# Patient Record
Sex: MALE | Race: OTHER | NOT HISPANIC OR LATINO
[De-identification: names, ages, dates, MRNs, and addresses within clinical notes are randomized per-mention and may not be internally consistent; named-entity substitution may affect disease eponyms.]

---

## 2020-11-13 ENCOUNTER — APPOINTMENT (OUTPATIENT)
Dept: RADIOLOGY | Facility: CLINIC | Age: 60
End: 2020-11-13
Payer: COMMERCIAL

## 2020-11-13 ENCOUNTER — OUTPATIENT (OUTPATIENT)
Dept: OUTPATIENT SERVICES | Facility: HOSPITAL | Age: 60
LOS: 1 days | End: 2020-11-13
Payer: COMMERCIAL

## 2020-11-13 DIAGNOSIS — Z00.8 ENCOUNTER FOR OTHER GENERAL EXAMINATION: ICD-10-CM

## 2020-11-13 PROCEDURE — 73562 X-RAY EXAM OF KNEE 3: CPT

## 2020-11-13 PROCEDURE — 73562 X-RAY EXAM OF KNEE 3: CPT | Mod: 26,LT

## 2021-04-02 ENCOUNTER — TRANSCRIPTION ENCOUNTER (OUTPATIENT)
Age: 61
End: 2021-04-02

## 2021-04-02 PROBLEM — Z00.00 ENCOUNTER FOR PREVENTIVE HEALTH EXAMINATION: Status: ACTIVE | Noted: 2021-04-02

## 2022-02-05 ENCOUNTER — EMERGENCY (EMERGENCY)
Facility: HOSPITAL | Age: 62
LOS: 1 days | Discharge: HOME | End: 2022-02-05
Attending: EMERGENCY MEDICINE | Admitting: EMERGENCY MEDICINE
Payer: COMMERCIAL

## 2022-02-05 VITALS
SYSTOLIC BLOOD PRESSURE: 128 MMHG | TEMPERATURE: 98 F | HEART RATE: 84 BPM | DIASTOLIC BLOOD PRESSURE: 91 MMHG | RESPIRATION RATE: 17 BRPM | OXYGEN SATURATION: 100 %

## 2022-02-05 PROCEDURE — 99283 EMERGENCY DEPT VISIT LOW MDM: CPT | Mod: 25

## 2022-02-05 PROCEDURE — 12011 RPR F/E/E/N/L/M 2.5 CM/<: CPT

## 2022-02-05 RX ORDER — TETANUS TOXOID, REDUCED DIPHTHERIA TOXOID AND ACELLULAR PERTUSSIS VACCINE, ADSORBED 5; 2.5; 8; 8; 2.5 [IU]/.5ML; [IU]/.5ML; UG/.5ML; UG/.5ML; UG/.5ML
0.5 SUSPENSION INTRAMUSCULAR ONCE
Refills: 0 | Status: COMPLETED | OUTPATIENT
Start: 2022-02-05 | End: 2022-02-05

## 2022-02-05 RX ADMIN — TETANUS TOXOID, REDUCED DIPHTHERIA TOXOID AND ACELLULAR PERTUSSIS VACCINE, ADSORBED 0.5 MILLILITER(S): 5; 2.5; 8; 8; 2.5 SUSPENSION INTRAMUSCULAR at 16:20

## 2022-02-05 NOTE — ED ADULT TRIAGE NOTE - CHIEF COMPLAINT QUOTE
pt states he slipped on ice yesterday and sustained a lac on his L eyelid. bleeding controlled in triage. gauze applied

## 2022-02-05 NOTE — ED PROVIDER NOTE - NSFOLLOWUPINSTRUCTIONS_ED_ALL_ED_FT
-You will be contacted by a hospital representative to help set up an appointment with a plastic surgeon during business hours. You may also use the patient access number above to set up your own appointment.    -Your stitches will need to be removed within 3-5 days.    -Wash your face gently with soap and water daily. Do not scrub as it may disrupt your stitches.    -If you notice redness, puss, or pain of your eyelid, please seek medical attention. -You will be contacted by a hospital representative to help set up an appointment with a plastic surgeon during business hours. You may also use the patient access number above to set up your own appointment.    -Your stitches will need to be removed within 3-5 days. You may return to the ER or go to the Plastic surgeon.    -Wash your face gently with soap and water daily. Do not scrub as it may disrupt your stitches.    -If you notice redness, puss, or pain of your eyelid, please seek medical attention.

## 2022-02-05 NOTE — ED PROVIDER NOTE - PATIENT PORTAL LINK FT
You can access the FollowMyHealth Patient Portal offered by [facility] by registering at the following website: [email or web address]. By joining [facility]’s FollowMyHealth portal, you will also be able to view your health information using other applications (apps) compatible with our system.

## 2022-12-22 PROBLEM — E11.9 TYPE 2 DIABETES MELLITUS WITHOUT COMPLICATIONS: Chronic | Status: ACTIVE | Noted: 2022-02-05

## 2022-12-27 ENCOUNTER — APPOINTMENT (OUTPATIENT)
Dept: PODIATRY | Facility: CLINIC | Age: 62
End: 2022-12-27
Payer: COMMERCIAL

## 2022-12-27 DIAGNOSIS — Z78.9 OTHER SPECIFIED HEALTH STATUS: ICD-10-CM

## 2022-12-27 PROCEDURE — 99203 OFFICE O/P NEW LOW 30 MIN: CPT

## 2022-12-27 PROCEDURE — 73620 X-RAY EXAM OF FOOT: CPT | Mod: LT

## 2022-12-27 NOTE — REASON FOR VISIT
[Initial Visit] : an initial visit for [Foot Pain] : foot pain [Plantar Fasciitis] : plantar fasciitis

## 2022-12-28 NOTE — ASSESSMENT
[Verbal] : verbal [Patient] : patient [Good - alert, interested, motivated] : Good - alert, interested, motivated [Pressure relief] : pressure relief [Off-loading] : off-loading [Other: ____] : [unfilled] [FreeTextEntry1] : Impression: More than likely the patient has a Plantar fasciitis, left (M72.2) with an inferior calcaneal spur (M77.3) and this may be the source of his irritation.  \par At this time, the patient does not have a significant amount of pain and he was advised on stretching, oral anti-inflammatories as necessary and appropriate soft-soled shoe gear.  \par Did not feel any further intervention was warranted at this time.  Any questions or problems, patient is to contact the office. \par

## 2022-12-28 NOTE — HISTORY OF PRESENT ILLNESS
[Sneakers] : vicky [FreeTextEntry1] : Patient presents today with a history of heel pain, left that is most painful first thing in the morning.  He stretches and then walks and the pain becomes minimal.  He was concerned.  He wears cushioned socks.  This has been present since June and started when he was gardening.

## 2022-12-28 NOTE — CONSULT LETTER
[Dear  ___] : Dear  [unfilled], [Consult Letter:] : I had the pleasure of evaluating your patient, [unfilled]. [Please see my note below.] : Please see my note below. [Consult Closing:] : Thank you very much for allowing me to participate in the care of this patient.  If you have any questions, please do not hesitate to contact me. [Sincerely,] : Sincerely, [FreeTextEntry2] : Gopi Whitehead MD\par [address] [facility]\par [address]\[place] [address] [FreeTextEntry3] : Charles Lombardi, DPM

## 2022-12-28 NOTE — PROCEDURE
[FreeTextEntry1] : X-rays were taken to rule out a fracture or stress fracture.\par (medial, lateral - right foot) There is an 2 inferior and calcaneal spurs; one is posterior and one is anterior.

## 2022-12-28 NOTE — PHYSICAL EXAM
[No Joint Swelling] : no joint swelling [Skin Color & Pigmentation] : normal skin color and pigmentation [Skin Turgor] : normal skin turgor [] : no rash [Skin Lesions] : no skin lesions [General Appearance - Alert] : alert [General Appearance - Well-Appearing] : healthy appearing [2+] : left foot dorsalis pedis 2+ [Sensation] : the sensory exam was normal to light touch and pinprick [No Focal Deficits] : no focal deficits [Deep Tendon Reflexes (DTR)] : deep tendon reflexes were 2+ and symmetric [Motor Exam] : the motor exam was normal [Oriented To Time, Place, And Person] : oriented to person, place, and time [Delayed in the Right Toes] : capillary refills normal in right toes [Delayed in the Left Toes] : capillary refills normal in the left toes [FreeTextEntry3] : Temperature gradient: warm to cool. [de-identified] : Mild pain on palpation, deeply and centrally within the plantar aspect of the heel.  Non-painful, non-crepitant.  No pain on medial and lateral compression.  \par  [Foot Ulcer] : no foot ulcer [Skin Induration] : no skin induration

## 2023-08-27 ENCOUNTER — EMERGENCY (EMERGENCY)
Facility: HOSPITAL | Age: 63
LOS: 1 days | Discharge: HOME | End: 2023-08-27
Attending: EMERGENCY MEDICINE | Admitting: EMERGENCY MEDICINE
Payer: COMMERCIAL

## 2023-08-27 ENCOUNTER — EMERGENCY (EMERGENCY)
Facility: HOSPITAL | Age: 63
LOS: 1 days | Discharge: HOME | End: 2023-08-27
Attending: STUDENT IN AN ORGANIZED HEALTH CARE EDUCATION/TRAINING PROGRAM | Admitting: EMERGENCY MEDICINE
Payer: COMMERCIAL

## 2023-08-27 VITALS
TEMPERATURE: 98 F | SYSTOLIC BLOOD PRESSURE: 116 MMHG | OXYGEN SATURATION: 100 % | RESPIRATION RATE: 17 BRPM | HEART RATE: 81 BPM | DIASTOLIC BLOOD PRESSURE: 87 MMHG

## 2023-08-27 VITALS — SYSTOLIC BLOOD PRESSURE: 126 MMHG | DIASTOLIC BLOOD PRESSURE: 81 MMHG | OXYGEN SATURATION: 100 % | HEART RATE: 70 BPM

## 2023-08-27 VITALS
DIASTOLIC BLOOD PRESSURE: 80 MMHG | HEART RATE: 93 BPM | SYSTOLIC BLOOD PRESSURE: 165 MMHG | TEMPERATURE: 98 F | RESPIRATION RATE: 15 BRPM | OXYGEN SATURATION: 100 %

## 2023-08-27 LAB
ALBUMIN SERPL ELPH-MCNC: 4.6 G/DL — SIGNIFICANT CHANGE UP (ref 3.3–5)
ALP SERPL-CCNC: 69 U/L — SIGNIFICANT CHANGE UP (ref 40–120)
ALT FLD-CCNC: 42 U/L — HIGH (ref 4–41)
ANION GAP SERPL CALC-SCNC: 14 MMOL/L — SIGNIFICANT CHANGE UP (ref 7–14)
APPEARANCE UR: CLEAR — SIGNIFICANT CHANGE UP
AST SERPL-CCNC: 32 U/L — SIGNIFICANT CHANGE UP (ref 4–40)
BACTERIA # UR AUTO: NEGATIVE /HPF — SIGNIFICANT CHANGE UP
BASOPHILS # BLD AUTO: 0.05 K/UL — SIGNIFICANT CHANGE UP (ref 0–0.2)
BASOPHILS NFR BLD AUTO: 0.4 % — SIGNIFICANT CHANGE UP (ref 0–2)
BILIRUB SERPL-MCNC: 0.9 MG/DL — SIGNIFICANT CHANGE UP (ref 0.2–1.2)
BILIRUB UR-MCNC: NEGATIVE — SIGNIFICANT CHANGE UP
BLD GP AB SCN SERPL QL: NEGATIVE — SIGNIFICANT CHANGE UP
BUN SERPL-MCNC: 15 MG/DL — SIGNIFICANT CHANGE UP (ref 7–23)
CALCIUM SERPL-MCNC: 9.6 MG/DL — SIGNIFICANT CHANGE UP (ref 8.4–10.5)
CAST: 0 /LPF — SIGNIFICANT CHANGE UP (ref 0–4)
CHLORIDE SERPL-SCNC: 100 MMOL/L — SIGNIFICANT CHANGE UP (ref 98–107)
CO2 SERPL-SCNC: 24 MMOL/L — SIGNIFICANT CHANGE UP (ref 22–31)
COLOR SPEC: YELLOW — SIGNIFICANT CHANGE UP
CREAT SERPL-MCNC: 0.82 MG/DL — SIGNIFICANT CHANGE UP (ref 0.5–1.3)
DIFF PNL FLD: NEGATIVE — SIGNIFICANT CHANGE UP
EGFR: 99 ML/MIN/1.73M2 — SIGNIFICANT CHANGE UP
EOSINOPHIL # BLD AUTO: 0.06 K/UL — SIGNIFICANT CHANGE UP (ref 0–0.5)
EOSINOPHIL NFR BLD AUTO: 0.5 % — SIGNIFICANT CHANGE UP (ref 0–6)
GLUCOSE SERPL-MCNC: 173 MG/DL — HIGH (ref 70–99)
GLUCOSE UR QL: NEGATIVE MG/DL — SIGNIFICANT CHANGE UP
HCT VFR BLD CALC: 44.5 % — SIGNIFICANT CHANGE UP (ref 39–50)
HGB BLD-MCNC: 14.5 G/DL — SIGNIFICANT CHANGE UP (ref 13–17)
IANC: 9.48 K/UL — HIGH (ref 1.8–7.4)
IMM GRANULOCYTES NFR BLD AUTO: 1 % — HIGH (ref 0–0.9)
KETONES UR-MCNC: NEGATIVE MG/DL — SIGNIFICANT CHANGE UP
LEUKOCYTE ESTERASE UR-ACNC: NEGATIVE — SIGNIFICANT CHANGE UP
LYMPHOCYTES # BLD AUTO: 17.2 % — SIGNIFICANT CHANGE UP (ref 13–44)
LYMPHOCYTES # BLD AUTO: 2.25 K/UL — SIGNIFICANT CHANGE UP (ref 1–3.3)
MCHC RBC-ENTMCNC: 26.1 PG — LOW (ref 27–34)
MCHC RBC-ENTMCNC: 32.6 GM/DL — SIGNIFICANT CHANGE UP (ref 32–36)
MCV RBC AUTO: 80.2 FL — SIGNIFICANT CHANGE UP (ref 80–100)
MONOCYTES # BLD AUTO: 1.09 K/UL — HIGH (ref 0–0.9)
MONOCYTES NFR BLD AUTO: 8.3 % — SIGNIFICANT CHANGE UP (ref 2–14)
NEUTROPHILS # BLD AUTO: 9.48 K/UL — HIGH (ref 1.8–7.4)
NEUTROPHILS NFR BLD AUTO: 72.6 % — SIGNIFICANT CHANGE UP (ref 43–77)
NITRITE UR-MCNC: NEGATIVE — SIGNIFICANT CHANGE UP
NRBC # BLD: 0 /100 WBCS — SIGNIFICANT CHANGE UP (ref 0–0)
NRBC # FLD: 0 K/UL — SIGNIFICANT CHANGE UP (ref 0–0)
PH UR: 6.5 — SIGNIFICANT CHANGE UP (ref 5–8)
PLATELET # BLD AUTO: 284 K/UL — SIGNIFICANT CHANGE UP (ref 150–400)
POTASSIUM SERPL-MCNC: 4.5 MMOL/L — SIGNIFICANT CHANGE UP (ref 3.5–5.3)
POTASSIUM SERPL-SCNC: 4.5 MMOL/L — SIGNIFICANT CHANGE UP (ref 3.5–5.3)
PROT SERPL-MCNC: 8.1 G/DL — SIGNIFICANT CHANGE UP (ref 6–8.3)
PROT UR-MCNC: NEGATIVE MG/DL — SIGNIFICANT CHANGE UP
RBC # BLD: 5.55 M/UL — SIGNIFICANT CHANGE UP (ref 4.2–5.8)
RBC # FLD: 13.8 % — SIGNIFICANT CHANGE UP (ref 10.3–14.5)
RBC CASTS # UR COMP ASSIST: 0 /HPF — SIGNIFICANT CHANGE UP (ref 0–4)
RH IG SCN BLD-IMP: POSITIVE — SIGNIFICANT CHANGE UP
SODIUM SERPL-SCNC: 138 MMOL/L — SIGNIFICANT CHANGE UP (ref 135–145)
SP GR SPEC: 1.01 — SIGNIFICANT CHANGE UP (ref 1–1.03)
SQUAMOUS # UR AUTO: 0 /HPF — SIGNIFICANT CHANGE UP (ref 0–5)
UROBILINOGEN FLD QL: 0.2 MG/DL — SIGNIFICANT CHANGE UP (ref 0.2–1)
WBC # BLD: 13.06 K/UL — HIGH (ref 3.8–10.5)
WBC # FLD AUTO: 13.06 K/UL — HIGH (ref 3.8–10.5)
WBC UR QL: 0 /HPF — SIGNIFICANT CHANGE UP (ref 0–5)

## 2023-08-27 PROCEDURE — 99285 EMERGENCY DEPT VISIT HI MDM: CPT

## 2023-08-27 RX ORDER — CIPROFLOXACIN LACTATE 400MG/40ML
250 VIAL (ML) INTRAVENOUS ONCE
Refills: 0 | Status: DISCONTINUED | OUTPATIENT
Start: 2023-08-27 | End: 2023-08-27

## 2023-08-27 RX ORDER — CIPROFLOXACIN LACTATE 400MG/40ML
1 VIAL (ML) INTRAVENOUS
Qty: 6 | Refills: 0
Start: 2023-08-27 | End: 2023-08-29

## 2023-08-27 RX ORDER — CIPROFLOXACIN LACTATE 400MG/40ML
500 VIAL (ML) INTRAVENOUS ONCE
Refills: 0 | Status: COMPLETED | OUTPATIENT
Start: 2023-08-27 | End: 2023-08-27

## 2023-08-27 RX ORDER — TAMSULOSIN HYDROCHLORIDE 0.4 MG/1
1 CAPSULE ORAL
Qty: 7 | Refills: 0
Start: 2023-08-27 | End: 2023-09-02

## 2023-08-27 RX ADMIN — Medication 500 MILLIGRAM(S): at 16:38

## 2023-08-27 NOTE — ED PROVIDER NOTE - PROGRESS NOTE DETAILS
Reyes output 1L, patient resting comfortably Reyes output 1L, patient resting comfortably. Given return precautions and follow-up instructions with teach back.  No blood work indicated at this time. Jimmy Lynn, ED Attending

## 2023-08-27 NOTE — ED PROVIDER NOTE - PHYSICAL EXAMINATION
General- appears uncomfortable, unable to lay still  HEENT- NCAT, EOMI  Cardio- tachycardic to 110s  Resp- lungs CTA B/L  Abd- soft, moderate distension, diffuse tenderness   Neuro- exam limited 2/2 intoxication and pain

## 2023-08-27 NOTE — ED PROVIDER NOTE - ATTENDING CONTRIBUTION TO CARE
I have personally performed a face to face medical and diagnostic evaluation of the patient. I have discussed with and reviewed the Resident's note and agree with the History, ROS, Physical Exam and MDM unless otherwise indicated. A brief summary of my personal evaluation and impression can be found below.    63-year-old male with no pertinent past medical history presenting with chief complaint of acute urinary retention.  States that he has not been able to pee since dinner tonight.  History of similar event once.  Not currently on any medications for BPH.  On exam, vital signs stable, mild tachycardia, suprapubic tenderness to palpation.  POCUS showing approximately 600 mL of urine in the bladder.  Plan for Reyes placement, UA UC.  If no actionable findings on urine, will DC with leg bag and urology follow-up.  We will monitor in the ER for auto diuresis.  No labs indicated at this time.  Reassess to dispo. Jimmy Lynn, ED Attending

## 2023-08-27 NOTE — ED ADULT NURSE NOTE - CHIEF COMPLAINT QUOTE
Pt arrives after being discharged over night for urinary retention. Pt states he had a lynch and he is now experiencing burning pain and bloody urination. Noted to have mariana red blood in drainage bag at this time. Denies use of blood thinners. Hx DM Finger stick 190

## 2023-08-27 NOTE — ED PROVIDER NOTE - OBJECTIVE STATEMENT
63 y/oM h/o DM (treated with "herbal substances") p/w urinary retention since 7pm. Endorsing urinary urgency however unable to urinate, with worsening pain and abdominal distention since 11pm. Admits to having several vodka crans but does not usually drink alcohol. Had a similar episode of retention ~5 years ago, but was able to urinate after drinking coffee. Denies fevers, HA, CP, SOB, N/V/D/C.

## 2023-08-27 NOTE — ED PROVIDER NOTE - PATIENT PORTAL LINK FT
You can access the FollowMyHealth Patient Portal offered by [facility] by registering at the following website: [email or web address]. By joining [facility]’s FollowMyHealth portal, you will also be able to view your health information using other applications (apps) compatible with our system.

## 2023-08-27 NOTE — ED PROVIDER NOTE - CLINICAL SUMMARY MEDICAL DECISION MAKING FREE TEXT BOX
Jailene Salinas DO PGY-3  63 year old male with PMH BPH, DM presents to the ED with penile pain after catching lynch on pants while getting ready to shower. POCUS demonstrated lynch out of place. Blood likely due to trauma after movement of lynch. Patient prefers trial of void prior to reinserting lynch. Will TOV and re-assess.

## 2023-08-27 NOTE — ED PROVIDER NOTE - CARE PLAN
Assessment and plan of treatment:	63 y/oM h/o DM (treated with "herbal substances") p/w urinary retention since 7pm    POCUS bladder with volume 640mL, will place lynch and obtain UA, UCx. Consider alcohol induced retention vs neurogenic vs obstructive BPH.   1 Principal Discharge DX:	Urinary retention  Assessment and plan of treatment:	63 y/oM h/o DM (treated with "herbal substances") p/w urinary retention since 7pm    POCUS bladder with volume 640mL, will place lynch and obtain UA, UCx. Consider alcohol induced retention vs neurogenic vs obstructive BPH.

## 2023-08-27 NOTE — ED PROVIDER NOTE - OBJECTIVE STATEMENT
63-year-old male with medical history of BPH presents to the ED with hematuria.  Patient reports that he had a Lynch catheter placed at 3 AM because he was in urinary retention, today was getting ready to shower when the catheter pulled on his pants. Since then the lynch has not been draining urine and has had bright red blood. Denies fever, chills, chest pain, shortness of breath, nausea, vomiting, diarrhea. Lynch had previously been draining normally with clear urine prior to th eevent.

## 2023-08-27 NOTE — ED ADULT NURSE NOTE - NSFALLUNIVINTERV_ED_ALL_ED
Bed/Stretcher in lowest position, wheels locked, appropriate side rails in place/Call bell, personal items and telephone in reach/Instruct patient to call for assistance before getting out of bed/chair/stretcher/Non-slip footwear applied when patient is off stretcher/[place] to call system/Physically safe environment - no spills, clutter or unnecessary equipment/Purposeful proactive rounding/Room/bathroom lighting operational, light cord in reach

## 2023-08-27 NOTE — ED PROVIDER NOTE - ATTENDING CONTRIBUTION TO CARE
This is a 63-year-old male with a past medical history of BPH presenting with hematuria.  Patient was seen in this emergency department earlier this morning and had a Reyes catheter placed because he had retention.  States he was getting ready to shower when the catheter pulled on his pants and since then the Reyes has not been draining and he has had red blood coming from his penis.  He does not feel dizziness he is not lightheaded he does not have any chest pain or shortness of breath.  He has slight pain at the insertion site of the Reyes catheter.  Ultrasound performed revealing Reyes catheter balloon not in the bladder therefore balloon was deflated and catheter was removed.  Meatus appears intact.  Patient wanted to have a trial of void which we offered him.  However upon voiding bright red blood coming from penis.  Low suspicion for urethral injury however with the bright red blood we did consult urology.  Urology has seen patient Reyes has been placed and they advised 5 days of Cipro with uro follow-up.  This was relayed to the patient.  Patient was advised that if he begins to feel pain in the tendons to immediately call urology as this is a side effect of ciprofloxacin.  He was told not to combine ciprofloxacin with steroids.  He will also be given Flomax he was told to take Flomax at night and that it can cause some lightheadedness.  Patient was given strict return precautions.  Patient was given Reyes catheter education.  Patient has no concerns at this time and will be discharged home.

## 2023-08-27 NOTE — ED ADULT NURSE NOTE - OBJECTIVE STATEMENT
Patient received in ED room 24. A&Ox4 and ambulatory. C/o urinary retention associated with lower abdominal pain, radiating to lower back. As per pt, drank 8 cranberry vodkas starting at 1900 last night. Pt appears sober, able to appropriately answer assessment questions. Pt states has urge to urinate, however unable to. Abdominal distention noted, pt states abdomen is more distended than baseline. Appears uncomfortable in stretcher. Speaking in full and complete sentences. HOB elevated. Last urine output was around 1800 yesterday as per pt. Offers no other complaints, denies CP, SOB, nausea, vomiting, headache, lightheadedness, dizziness, fever or chills. CIWA performed and charted in flowsheet. Family at bedside. Bed in lowest position, call bell in hand, fall precautions in effect, appropriate side rails up, wheels locked, safety maintained. Awaiting MD orders.

## 2023-08-27 NOTE — ED ADULT NURSE REASSESSMENT NOTE - NSFALLHARMRISKINTERV_ED_ALL_ED

## 2023-08-27 NOTE — ED PROCEDURE NOTE - ATTENDING CONTRIBUTION TO CARE
I have personally discussed the indications, risks and benefits of the above procedure with the resident. I was present for key portions of the procedure and assisted when required. Jimmy Lynn, ED Attending

## 2023-08-27 NOTE — ED ADULT NURSE REASSESSMENT NOTE - NS ED NURSE REASSESS COMMENT FT1
Urinary drainage bag changed to leg bag as per MD Sanchez verbal orders. Pt verbalizes improvement in pain, denies any pain. Denies CP, abdominal pain, SOB, nausea, vomiting, headache, lightheadedness, dizziness, fever or chills. Well-appearing. Appears comfortably sitting up in stretcher HOB elevated. Speaking in full and complete sentences. No acute distress noted. family at bedside. Safety maintained. Awaiting dispo as per MD order.
Indwelling Reyes catheter placed as ordered. Size 14. Tolerated well. 1200mL light yellow urine drained in drainage bag. Pt verbalizes improvement in pain level, currently reports 6/10 pain level. Appears comfortable laying in stretcher. Family back at bedside. NSR on bedside cardiac monitor. Respirations even and unlabored. No acute distress noted. Speaking in full and complete sentences. Bed in lowest position, call bell in hand, wheels locked, safety maintained. Awaiting further orders.

## 2023-08-27 NOTE — ED PROVIDER NOTE - NSPTACCESSSVCSAPPTDETAILS_ED_ALL_ED_FT
Patient needs immediate follow-up with a urologist for a Reyes catheter that was placed in the emergency department for urinary retention.

## 2023-08-27 NOTE — ED PROVIDER NOTE - PATIENT PORTAL LINK FT
You can access the FollowMyHealth Patient Portal offered by [facility] by registering at the following website: [email or web address]. By joining [employer]’s FollowMyHealth portal, you will also be able to view your health information using other applications (apps) compatible with our system.

## 2023-08-27 NOTE — PROCEDURE NOTE - ADDITIONAL PROCEDURE DETAILS
Initially placed 20Fr 6-eye catheter which drained initially about 20cc of red urine with small clots followed by about 700cc of clear yellow urine. Then irrigated the bladder and removed minimal clot until the urine was clear. 18Fr lynch catheter placed with 10cc in balloon and urine was pink tinged

## 2023-08-27 NOTE — ED PROVIDER NOTE - CLINICAL SUMMARY MEDICAL DECISION MAKING FREE TEXT BOX
63-year-old male here for acute urinary retention.  Point-of-care ultrasound demonstrates significant retention with greater than 600 mL.  We will place a Reyes catheter, likely discharge with urology follow-up as long as there are no concerning findings on urinalysis.

## 2023-08-27 NOTE — ED PROVIDER NOTE - PROGRESS NOTE DETAILS
Jailene Salinas, DO PGY-3  Patient with large amount of bright red blood continuously draining from penis after trial of void. Will obtain labs and consult urology. Jailene Salinas, DO PGY-3  Uro consulted, will see patient in the ER. Jailene Salinas DO PGY-3  Urology has cleared patient for discharge home, recommend ciprofloxacin and flomax. Will see patient in their office on Friday. Discussed antibiotics with patient and told to discuss with urology for any signs/symptoms of tendon ruptures.     Discussed the plan for discharge home with the patient. Advised return precautions for any alarming or worsening symptoms, or any other concerns. Recommended that the patient call their primary care doctor today, inform them of their visit to the ER, and to obtain repeat evaluation from their PCP in the next 1-3 days. Patient expresses understanding and agrees with the plan for follow up. At the time of discharge the patient remained stable, in no acute distress.

## 2023-08-27 NOTE — ED PROVIDER NOTE - NSFOLLOWUPINSTRUCTIONS_ED_ALL_ED_FT
[facility]  [address] [place] [id number]  [phone] You were seen in the ER today for lynch catheter problems.    FOLLOW UP WITH THE UROLOGIST ON FRIDAY. CALL THE OFFICE TO CONFIRM AN APPOINTMENT.  [facility]  [address] [place] [address] [id number] [phone]    Take the antibiotics as prescribed: ciprofloxacin 1 tablet twice a day for the next 3 days.  Take tamsulosin as prescribed: 1 tablet at bedtime.     Please follow up with your primary care doctor within 1 - 3 days. Call and let them know you were seen in the ER today.   Bring the results of your blood work and imaging with you to your appointment, if applicable.    Return to the ER for any worsening symptoms or concerns, including chest pain, shortness of breath, lightheadedness, weakness, or any other concerns.

## 2023-08-27 NOTE — ED PROVIDER NOTE - NSFOLLOWUPINSTRUCTIONS_ED_ALL_ED_FT
You were seen in the emergency department for urinary retention. We have evaluated you and determined that you do not require further hospital interventions.    During your stay you had the following relevant results: Urine test that do not show evidence of infection    Please follow up with a urologist to discuss the results of your stay in our department.    If you start to experience worsening symptoms such as severe abdominal pain, nausea or vomiting, fevers or chills, please return to the emergency department for further evaluation.

## 2023-08-27 NOTE — ED ADULT NURSE NOTE - OBJECTIVE STATEMENT
pt had lynch placed during the night for Urinary retention c/o that he has noticed blood in the drainage bag, attending performed bladder scan and lynch balloon was not in the bladder and lynch removed by attending.

## 2023-08-27 NOTE — PROCEDURE NOTE - NSPROCDETAILS_GEN_ALL_CORE
sterile technique, indwelling urinary device inserted/sterile technique, non-indwelling urinary device inserted

## 2023-08-27 NOTE — ED PROVIDER NOTE - PHYSICAL EXAMINATION
PHYSICAL EXAM:  CONSTITUTIONAL: Well appearing, awake, alert, oriented to person, place, time/situation and in no apparent distress.  HEAD: Atraumatic  EYES: Clear bilaterally, pupils equal, round and reactive to light.  ENMT: Airway patent, Nasal mucosa clear. Mouth with normal mucosa. Uvula is midline.   CARDIAC: Normal rate, regular rhythm. +S1/S2. No murmurs, rubs or gallops.  RESPIRATORY: Breathing unlabored. Breath sounds clear and equal bilaterally.  ABDOMEN:  Soft, nontender, nondistended. No rebound tenderness or guarding.  GENITOURINARY: Normal external male genitalia with catheter in urethra, red blood in lynch bag.   NEUROLOGICAL: Alert and oriented, no focal deficits, no motor or sensory deficits.  MSK: No clubbing, cyanosis, or edema. Full range of motion of all extremities.   SKIN: Skin warm and dry. No evidence of rashes or lesions.

## 2023-08-27 NOTE — ED PROVIDER NOTE - PLAN OF CARE
63 y/oM h/o DM (treated with "herbal substances") p/w urinary retention since 7pm    POCUS bladder with volume 640mL, will place lynch and obtain UA, UCx. Consider alcohol induced retention vs neurogenic vs obstructive BPH.

## 2023-08-27 NOTE — CONSULT NOTE ADULT - SUBJECTIVE AND OBJECTIVE BOX
HPI   63-year-old male with medical history of diabetes presents to the ED with hematuria.  Patient reports that he had a Lynch catheter placed at 3 AM because he was in urinary retention this morning after a night of drinking excessively. Today he was getting ready to shower when the catheter pulled on his pants. Since then the lynch has not been draining urine and has been red with some small clots. Denies fever, chills, chest pain, shortness of breath, nausea, vomiting, diarrhea. Lynch had previously been draining normally with clear urine prior to the event. Urology called bedside due to gross hematuria in the setting of traumatic incidental pulling of lynch. ED had removed lynch by the time urology was bedside. Patient reports he had one similar episode of urinary retention about 5 years ago after drinking excessively. Has never seen a urologist. His PMD has been getting PSA and doing SABINE with no abnormal results. Patient denies any other episodes of gross hematuria, weak stream, frequency, straining, or nocturia.     PAST MEDICAL & SURGICAL HISTORY:  Diabetes          MEDICATIONS  (STANDING):    MEDICATIONS  (PRN):      FAMILY HISTORY:      Allergies    penicillin (Unknown)    Intolerances        SOCIAL HISTORY:    REVIEW OF SYSTEMS: Otherwise negative as stated in HPI    Physical Exam  Vital signs  T(C): 36.7 (08-27-23 @ 09:54), Max: 36.7 (08-27-23 @ 09:54)  HR: 81 (08-27-23 @ 09:54)  BP: 116/87 (08-27-23 @ 09:54)  SpO2: 100% (08-27-23 @ 09:54)  Wt(kg): --    Output      Gen: NAD  Pulm: No respiratory distress	  CV: RRR  GI: S/ND/NT  : 6-eye catheter placed, 700cc of urine removed. Bladder was irrigated with removal of minimal old clot. 18Fr lynch placed (see procedure note for details). Lynch in place draining pink tinged urine  MSK: moves all 4 limbs spontaneously    LABS:      08-27 @ 13:12    WBC 13.06 / Hct 44.5  / SCr 0.82     08-27    138  |  100  |  15  ----------------------------<  173<H>  4.5   |  24  |  0.82    Ca    9.6      27 Aug 2023 13:12    TPro  8.1  /  Alb  4.6  /  TBili  0.9  /  DBili  x   /  AST  32  /  ALT  42<H>  /  AlkPhos  69  08-27      Urinalysis Basic - ( 27 Aug 2023 13:12 )    Color: x / Appearance: x / SG: x / pH: x  Gluc: 173 mg/dL / Ketone: x  / Bili: x / Urobili: x   Blood: x / Protein: x / Nitrite: x   Leuk Esterase: x / RBC: x / WBC x   Sq Epi: x / Non Sq Epi: x / Bacteria: x    Urine Culture: pending

## 2023-08-27 NOTE — ED ADULT NURSE REASSESSMENT NOTE - NS ED NURSE REASSESS COMMENT FT1
Reyes removed by attending, pt given water to drink and reeval pt if he can void.
Reyes changed to leg bag, pt medicated as ordered and IV d/c

## 2023-08-27 NOTE — ED ADULT TRIAGE NOTE - CHIEF COMPLAINT QUOTE
presents c/O urinary retention since 7pm. had 8 drinks vodka cranberry. last drink 1 hour ago. No complaints of chest pain, headache, nausea, dizziness, vomiting  SOB, fever, chills verbalized. phx DM

## 2023-08-27 NOTE — CONSULT NOTE ADULT - ASSESSMENT
63M with diabetes coming into ED with gross hematuria and urinary retention after by mistake tugging on catheter this morning. Catheter was initially placed at 3 AM. Labs wnl.  - Please discharge home with lynch catheter in place  - Recommend flomax  - Cipro for 5d due to urinary tract manipulation  - Discussed lynch care, please provide patient with leg bag  - Follow up outpatient on friday with the [facility] for TOV  - Discussed with Dr. Matthews    [facility]  [address] [place] [id number] [phone]

## 2023-08-27 NOTE — ED PROCEDURE NOTE - NS ED PROCEDURE ASSISTED BY
Please call patient and thank him for getting his labs completed. He has been noted to have a slightly elevated A1C. We would like him to be below 5.6 and he was 5.7 which shows he is at an elevated risk for diabetes. I would like him to work on monitoring his appetite and add exercise to help with this number. His total cholesterol was also elevated along with his triglycerides. His good cholesterol HDL was also low. I would like him to work on heart health foods and exercise to help with this. We will continue to monitor his labs yearly. I also recommend that he have a primary care that he is established with and get a yearly physical.    Thank you   Supervision was available

## 2023-08-28 LAB
CULTURE RESULTS: NO GROWTH — SIGNIFICANT CHANGE UP
SPECIMEN SOURCE: SIGNIFICANT CHANGE UP

## 2023-09-01 ENCOUNTER — APPOINTMENT (OUTPATIENT)
Dept: UROLOGY | Facility: CLINIC | Age: 63
End: 2023-09-01
Payer: COMMERCIAL

## 2023-09-01 VITALS
BODY MASS INDEX: 22.9 KG/M2 | DIASTOLIC BLOOD PRESSURE: 83 MMHG | HEART RATE: 89 BPM | RESPIRATION RATE: 16 BRPM | TEMPERATURE: 98.1 F | SYSTOLIC BLOOD PRESSURE: 126 MMHG | WEIGHT: 160 LBS | HEIGHT: 70 IN | OXYGEN SATURATION: 97 %

## 2023-09-01 DIAGNOSIS — R33.9 RETENTION OF URINE, UNSPECIFIED: ICD-10-CM

## 2023-09-01 PROCEDURE — 99204 OFFICE O/P NEW MOD 45 MIN: CPT

## 2023-09-01 NOTE — PHYSICAL EXAM
[General Appearance - Well Developed] : well developed [General Appearance - Well Nourished] : well nourished [Normal Appearance] : normal appearance [Well Groomed] : well groomed [General Appearance - In No Acute Distress] : no acute distress [Edema] : no peripheral edema [] : no respiratory distress [Respiration, Rhythm And Depth] : normal respiratory rhythm and effort [Exaggerated Use Of Accessory Muscles For Inspiration] : no accessory muscle use [Abdomen Soft] : soft [Abdomen Tenderness] : non-tender [Costovertebral Angle Tenderness] : no ~M costovertebral angle tenderness [Urethral Meatus] : meatus normal [Urinary Bladder Findings] : the bladder was normal on palpation [Scrotum] : the scrotum was normal [Testes Mass (___cm)] : there were no testicular masses [FreeTextEntry1] : lynch clear urine . small blood around meatus  [Normal Station and Gait] : the gait and station were normal for the patient's age [No Focal Deficits] : no focal deficits

## 2023-09-01 NOTE — HISTORY OF PRESENT ILLNESS
[FreeTextEntry1] : - Subjective and Objective: HPI  63-year-old male with medical history of diabetes presents to the ED with hematuria.  Patient reports that he had a Lynch catheter placed at 3 AM because he was in urinary retention this morning after a night of drinking excessively. Today he was getting ready to shower when the catheter pulled on his pants. Since then the lynch has not been draining urine and has been red with some small clots. Denies fever, chills, chest pain, shortness of breath, nausea, vomiting, diarrhea. Lynch had previously been draining normally with clear urine prior to the event. Urology called bedside due to gross hematuria in the setting of traumatic incidental pulling of lynch. ED had removed lynch by the time urology was bedside. Patient reports he had one similar episode of urinary retention about 5 years ago after drinking excessively. Has never seen a urologist. His PMD has been getting PSA and doing SABINE with no abnormal results. Patient denies any other episodes of gross hematuria, weak stream, frequency, straining, or nocturia  started tamsulosin urine now clear

## 2023-09-01 NOTE — ASSESSMENT
[FreeTextEntry1] : lynch removed and pt voided well although a little slow cont tamsulosin     Voiding time 94 Flow time 86 Time to max flow 46. Max flow 11.5 Ave Flow 5.2 Void volume 444

## 2024-05-23 NOTE — ED ADULT NURSE NOTE - AS SC BRADEN FRICTION
(3) no apparent problem alert/oriented to person/oriented to place/oriented to time/oriented to situation/recall memory is intact

## 2024-06-26 NOTE — ED ADULT TRIAGE NOTE - PATIENT ON (OXYGEN DELIVERY METHOD)
"Patient called to report sudden onset of headache. Patient denies fever, cold/cough symptoms. Patient denies any weakness, changes to vision, changes to speech, confusion or dizziness. Colleague took the patient's /80. Patient states that this is elevated for her. Patient declined a visit. Home care advice given. Patient also notes some bleeding from the nose when she blows her nose. This may be attributed to HTN or dry mucous membranes. Please review and contact the patient for any other advice.  Reason for Disposition   Mild-moderate headache    Answer Assessment - Initial Assessment Questions  1. LOCATION: \"Where does it hurt?\"       Top of head  2. ONSET: \"When did the headache start?\" (Minutes, hours or days)       Sudden onset this AM  3. PATTERN: \"Does the pain come and go, or has it been constant since it started?\"      constant  4. SEVERITY: \"How bad is the pain?\" and \"What does it keep you from doing?\"  (e.g., Scale 1-10; mild, moderate, or severe)    - MILD (1-3): doesn't interfere with normal activities     - MODERATE (4-7): interferes with normal activities or awakens from sleep     - SEVERE (8-10): excruciating pain, unable to do any normal activities         moderate  5. RECURRENT SYMPTOM: \"Have you ever had headaches before?\" If Yes, ask: \"When was the last time?\" and \"What happened that time?\"       no  6. CAUSE: \"What do you think is causing the headache?\"      unsure  7. MIGRAINE: \"Have you been diagnosed with migraine headaches?\" If Yes, ask: \"Is this headache similar?\"       denies  8. HEAD INJURY: \"Has there been any recent injury to the head?\"       denies  9. OTHER SYMPTOMS: \"Do you have any other symptoms?\" (fever, stiff neck, eye pain, sore throat, cold symptoms)      Nose bleed  10. PREGNANCY: \"Is there any chance you are pregnant?\" \"When was your last menstrual period?\"        denies    Protocols used: Headache-ADULT-[place]    " room air

## 2024-08-14 NOTE — ED ADULT NURSE NOTE - SUICIDE SCREENING QUESTION 1
Pt did not return call to answer CT Calcium Score question in order to sign order off.     Order removed in order to sigh US order.    Paola Mello RN     No

## 2025-03-29 DIAGNOSIS — N48.6 INDURATION PENIS PLASTICA: ICD-10-CM

## 2025-04-01 ENCOUNTER — APPOINTMENT (OUTPATIENT)
Dept: UROLOGY | Facility: CLINIC | Age: 65
End: 2025-04-01
Payer: COMMERCIAL

## 2025-04-01 PROCEDURE — 99214 OFFICE O/P EST MOD 30 MIN: CPT | Mod: 95

## 2025-04-01 PROCEDURE — G2211 COMPLEX E/M VISIT ADD ON: CPT | Mod: NC,95

## 2025-04-01 RX ORDER — PAPAVERINE HYDROCHLORIDE 30 MG/ML
30 INJECTION, SOLUTION INTRAVENOUS
Qty: 2 | Refills: 1 | Status: ACTIVE | COMMUNITY
Start: 2025-04-01 | End: 1900-01-01

## 2025-06-19 ENCOUNTER — APPOINTMENT (OUTPATIENT)
Dept: UROLOGY | Facility: CLINIC | Age: 65
End: 2025-06-19

## 2025-06-19 ENCOUNTER — APPOINTMENT (OUTPATIENT)
Dept: UROLOGY | Facility: CLINIC | Age: 65
End: 2025-06-19
Payer: COMMERCIAL

## 2025-06-19 ENCOUNTER — OUTPATIENT (OUTPATIENT)
Dept: OUTPATIENT SERVICES | Facility: HOSPITAL | Age: 65
LOS: 1 days | End: 2025-06-19
Payer: COMMERCIAL

## 2025-06-19 DIAGNOSIS — R35.0 FREQUENCY OF MICTURITION: ICD-10-CM

## 2025-06-19 PROCEDURE — 93980 PENILE VASCULAR STUDY: CPT

## 2025-06-19 PROCEDURE — 93980 PENILE VASCULAR STUDY: CPT | Mod: 26

## 2025-06-19 PROCEDURE — G2211 COMPLEX E/M VISIT ADD ON: CPT | Mod: NC,95

## 2025-06-19 PROCEDURE — 54235 NJX CORPORA CAVERNOSA RX AGT: CPT

## 2025-06-19 PROCEDURE — 99214 OFFICE O/P EST MOD 30 MIN: CPT | Mod: 95

## 2025-06-27 DIAGNOSIS — N48.6 INDURATION PENIS PLASTICA: ICD-10-CM

## 2025-08-12 ENCOUNTER — NON-APPOINTMENT (OUTPATIENT)
Age: 65
End: 2025-08-12

## 2025-08-12 ENCOUNTER — APPOINTMENT (OUTPATIENT)
Dept: UROLOGY | Facility: CLINIC | Age: 65
End: 2025-08-12
Payer: COMMERCIAL

## 2025-08-12 VITALS
HEIGHT: 70 IN | DIASTOLIC BLOOD PRESSURE: 72 MMHG | OXYGEN SATURATION: 97 % | SYSTOLIC BLOOD PRESSURE: 112 MMHG | HEART RATE: 71 BPM | WEIGHT: 160 LBS | BODY MASS INDEX: 22.9 KG/M2

## 2025-08-12 PROCEDURE — G2211 COMPLEX E/M VISIT ADD ON: CPT | Mod: NC

## 2025-08-12 PROCEDURE — 99214 OFFICE O/P EST MOD 30 MIN: CPT

## 2025-09-05 ENCOUNTER — APPOINTMENT (OUTPATIENT)
Dept: UROLOGY | Facility: CLINIC | Age: 65
End: 2025-09-05

## 2025-09-12 ENCOUNTER — APPOINTMENT (OUTPATIENT)
Dept: UROLOGY | Facility: CLINIC | Age: 65
End: 2025-09-12
Payer: COMMERCIAL

## 2025-09-12 VITALS
HEART RATE: 59 BPM | TEMPERATURE: 97.9 F | BODY MASS INDEX: 22.62 KG/M2 | OXYGEN SATURATION: 97 % | RESPIRATION RATE: 16 BRPM | DIASTOLIC BLOOD PRESSURE: 78 MMHG | WEIGHT: 158 LBS | SYSTOLIC BLOOD PRESSURE: 127 MMHG | HEIGHT: 70 IN

## 2025-09-12 DIAGNOSIS — N48.6 INDURATION PENIS PLASTICA: ICD-10-CM

## 2025-09-12 DIAGNOSIS — Z86.39 PERSONAL HISTORY OF OTHER ENDOCRINE, NUTRITIONAL AND METABOLIC DISEASE: ICD-10-CM

## 2025-09-12 DIAGNOSIS — E11.65 TYPE 2 DIABETES MELLITUS WITH HYPERGLYCEMIA: ICD-10-CM

## 2025-09-12 DIAGNOSIS — N52.9 MALE ERECTILE DYSFUNCTION, UNSPECIFIED: ICD-10-CM

## 2025-09-12 PROCEDURE — 51798 US URINE CAPACITY MEASURE: CPT

## 2025-09-12 PROCEDURE — 99214 OFFICE O/P EST MOD 30 MIN: CPT | Mod: 25

## 2025-09-16 ENCOUNTER — APPOINTMENT (OUTPATIENT)
Age: 65
End: 2025-09-16